# Patient Record
Sex: MALE | URBAN - METROPOLITAN AREA
[De-identification: names, ages, dates, MRNs, and addresses within clinical notes are randomized per-mention and may not be internally consistent; named-entity substitution may affect disease eponyms.]

---

## 2018-06-07 ENCOUNTER — APPOINTMENT (RX ONLY)
Dept: URBAN - METROPOLITAN AREA CLINIC 52 | Facility: CLINIC | Age: 83
Setting detail: DERMATOLOGY
End: 2018-06-07

## 2018-06-07 DIAGNOSIS — L72.8 OTHER FOLLICULAR CYSTS OF THE SKIN AND SUBCUTANEOUS TISSUE: ICD-10-CM

## 2018-06-07 PROBLEM — Z85.46 PERSONAL HISTORY OF MALIGNANT NEOPLASM OF PROSTATE: Status: ACTIVE | Noted: 2018-06-07

## 2018-06-07 PROBLEM — I10 ESSENTIAL (PRIMARY) HYPERTENSION: Status: ACTIVE | Noted: 2018-06-07

## 2018-06-07 PROBLEM — E13.9 OTHER SPECIFIED DIABETES MELLITUS WITHOUT COMPLICATIONS: Status: ACTIVE | Noted: 2018-06-07

## 2018-06-07 PROBLEM — C18.9 MALIGNANT NEOPLASM OF COLON, UNSPECIFIED: Status: ACTIVE | Noted: 2018-06-07

## 2018-06-07 PROCEDURE — 10060 I&D ABSCESS SIMPLE/SINGLE: CPT

## 2018-06-07 PROCEDURE — ? COUNSELING

## 2018-06-07 PROCEDURE — ? INCISION AND DRAINAGE

## 2018-06-07 PROCEDURE — ? PRESCRIPTION

## 2018-06-07 RX ORDER — DOXYCYCLINE HYCLATE 100 MG/1
QHS CAPSULE, GELATIN COATED ORAL QD
Qty: 14 | Refills: 0 | Status: ERX | COMMUNITY
Start: 2018-06-07

## 2018-06-07 RX ADMIN — DOXYCYCLINE HYCLATE QHS: 100 CAPSULE, GELATIN COATED ORAL at 19:48

## 2018-06-07 ASSESSMENT — LOCATION DETAILED DESCRIPTION DERM: LOCATION DETAILED: RIGHT CENTRAL PARIETAL SCALP

## 2018-06-07 ASSESSMENT — LOCATION ZONE DERM: LOCATION ZONE: SCALP

## 2018-06-07 ASSESSMENT — LOCATION SIMPLE DESCRIPTION DERM: LOCATION SIMPLE: SCALP

## 2018-06-07 NOTE — PROCEDURE: INCISION AND DRAINAGE
Lesion Type: Abscess
Size Of Lesion In Cm (Optional But May Be Required For Some Insurances): 0
Method: 11 blade
Epidermal Closure: simple interrupted
Render Postcare In Note?: No
Post-Care Instructions: I reviewed with the patient in detail post-care instructions. Patient should keep wound covered and call the office should any redness, pain, swelling or worsening occur.
Dressing: dry sterile dressing
Curette Text (Optional): After the contents were expressed a curette was used to partially remove the cyst wall.
Preparation Text: The area was prepped in the usual clean fashion.
Suture Text: The incision was partially closed with
Anesthesia Volume In Cc: -
Detail Level: Detailed
Epidermal Sutures: 4-0 Ethilon
Wound Care: Petrolatum
Consent was obtained and risks were reviewed including but not limited to delayed wound healing, infection, need for multiple I and D's, and pain.
Anesthesia Type: 1% lidocaine with epinephrine